# Patient Record
Sex: MALE | Race: BLACK OR AFRICAN AMERICAN
[De-identification: names, ages, dates, MRNs, and addresses within clinical notes are randomized per-mention and may not be internally consistent; named-entity substitution may affect disease eponyms.]

---

## 2023-01-28 ENCOUNTER — HOSPITAL ENCOUNTER (OUTPATIENT)
Dept: HOSPITAL 47 - EC | Age: 68
Setting detail: OBSERVATION
LOS: 3 days | Discharge: NURSING, REHAB OR LONG-TERM CARE | End: 2023-01-31
Attending: INTERNAL MEDICINE | Admitting: INTERNAL MEDICINE
Payer: MEDICARE

## 2023-01-28 VITALS — BODY MASS INDEX: 41.1 KG/M2

## 2023-01-28 DIAGNOSIS — Z86.19: ICD-10-CM

## 2023-01-28 DIAGNOSIS — M11.261: ICD-10-CM
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
## 2023-01-28 DIAGNOSIS — E86.0: ICD-10-CM

## 2023-01-28 DIAGNOSIS — M25.561: ICD-10-CM

## 2023-01-28 DIAGNOSIS — Z79.899: ICD-10-CM

## 2023-01-28 DIAGNOSIS — Z79.85: ICD-10-CM

## 2023-01-28 DIAGNOSIS — R55: ICD-10-CM
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
## 2023-01-28 DIAGNOSIS — Z87.440: ICD-10-CM

## 2023-01-28 DIAGNOSIS — N17.9: ICD-10-CM

## 2023-01-28 DIAGNOSIS — E66.01: ICD-10-CM

## 2023-01-28 DIAGNOSIS — L89.152: ICD-10-CM

## 2023-01-28 DIAGNOSIS — E11.622: ICD-10-CM

## 2023-01-28 DIAGNOSIS — M25.461: ICD-10-CM

## 2023-01-28 DIAGNOSIS — R42: Primary | ICD-10-CM

## 2023-01-28 DIAGNOSIS — I25.10: ICD-10-CM

## 2023-01-28 DIAGNOSIS — Z20.822: ICD-10-CM

## 2023-01-28 DIAGNOSIS — M17.11: ICD-10-CM

## 2023-01-28 DIAGNOSIS — R61: ICD-10-CM

## 2023-01-28 DIAGNOSIS — Z79.82: ICD-10-CM

## 2023-01-28 DIAGNOSIS — Z79.4: ICD-10-CM

## 2023-01-28 DIAGNOSIS — I44.1: ICD-10-CM

## 2023-01-28 DIAGNOSIS — D64.9: ICD-10-CM

## 2023-01-28 DIAGNOSIS — L89.892: ICD-10-CM

## 2023-01-28 DIAGNOSIS — I10: ICD-10-CM

## 2023-01-28 DIAGNOSIS — Z79.84: ICD-10-CM

## 2023-01-28 DIAGNOSIS — E78.5: ICD-10-CM

## 2023-01-28 DIAGNOSIS — Z91.81: ICD-10-CM

## 2023-01-28 LAB
ALBUMIN SERPL-MCNC: 3.1 G/DL (ref 3.5–5)
ALP SERPL-CCNC: 132 U/L (ref 38–126)
ALT SERPL-CCNC: 20 U/L (ref 4–49)
ANION GAP SERPL CALC-SCNC: 5 MMOL/L
APTT BLD: 26 SEC (ref 22–30)
AST SERPL-CCNC: 24 U/L (ref 17–59)
BASOPHILS # BLD AUTO: 0 K/UL (ref 0–0.2)
BASOPHILS NFR BLD AUTO: 1 %
BUN SERPL-SCNC: 14 MG/DL (ref 9–20)
CALCIUM SPEC-MCNC: 8.8 MG/DL (ref 8.4–10.2)
CHLORIDE SERPL-SCNC: 101 MMOL/L (ref 98–107)
CO2 SERPL-SCNC: 30 MMOL/L (ref 22–30)
EOSINOPHIL # BLD AUTO: 0.2 K/UL (ref 0–0.7)
EOSINOPHIL NFR BLD AUTO: 2 %
ERYTHROCYTE [DISTWIDTH] IN BLOOD BY AUTOMATED COUNT: 4.33 M/UL (ref 4.3–5.9)
ERYTHROCYTE [DISTWIDTH] IN BLOOD: 14.2 % (ref 11.5–15.5)
GLUCOSE BLD-MCNC: 139 MG/DL (ref 70–110)
GLUCOSE BLD-MCNC: 196 MG/DL (ref 70–110)
GLUCOSE SERPL-MCNC: 155 MG/DL (ref 74–99)
GLUCOSE UR QL: (no result)
HCT VFR BLD AUTO: 32.2 % (ref 39–53)
HGB BLD-MCNC: 10.9 GM/DL (ref 13–17.5)
INR PPP: 1.1 (ref ?–1.2)
LYMPHOCYTES # SPEC AUTO: 1.5 K/UL (ref 1–4.8)
LYMPHOCYTES NFR SPEC AUTO: 19 %
MAGNESIUM SPEC-SCNC: 1.8 MG/DL (ref 1.6–2.3)
MCH RBC QN AUTO: 25.3 PG (ref 25–35)
MCHC RBC AUTO-ENTMCNC: 34 G/DL (ref 31–37)
MCV RBC AUTO: 74.5 FL (ref 80–100)
MONOCYTES # BLD AUTO: 0.4 K/UL (ref 0–1)
MONOCYTES NFR BLD AUTO: 5 %
NEUTROPHILS # BLD AUTO: 5.9 K/UL (ref 1.3–7.7)
NEUTROPHILS NFR BLD AUTO: 72 %
PH UR: 7 [PH] (ref 5–8)
PLATELET # BLD AUTO: 392 K/UL (ref 150–450)
POTASSIUM SERPL-SCNC: 4.5 MMOL/L (ref 3.5–5.1)
PROT SERPL-MCNC: 6.7 G/DL (ref 6.3–8.2)
PT BLD: 11.5 SEC (ref 9–12)
SODIUM SERPL-SCNC: 136 MMOL/L (ref 137–145)
SP GR UR: 1.01 (ref 1–1.03)
UROBILINOGEN UR QL STRIP: <2 MG/DL (ref ?–2)
WBC # BLD AUTO: 8.1 K/UL (ref 3.8–10.6)
WBC # UR AUTO: 47 /HPF (ref 0–5)

## 2023-01-28 PROCEDURE — 84443 ASSAY THYROID STIM HORMONE: CPT

## 2023-01-28 PROCEDURE — 96372 THER/PROPH/DIAG INJ SC/IM: CPT

## 2023-01-28 PROCEDURE — 84484 ASSAY OF TROPONIN QUANT: CPT

## 2023-01-28 PROCEDURE — 85730 THROMBOPLASTIN TIME PARTIAL: CPT

## 2023-01-28 PROCEDURE — 99285 EMERGENCY DEPT VISIT HI MDM: CPT

## 2023-01-28 PROCEDURE — 36415 COLL VENOUS BLD VENIPUNCTURE: CPT

## 2023-01-28 PROCEDURE — 96361 HYDRATE IV INFUSION ADD-ON: CPT

## 2023-01-28 PROCEDURE — 70450 CT HEAD/BRAIN W/O DYE: CPT

## 2023-01-28 PROCEDURE — 73562 X-RAY EXAM OF KNEE 3: CPT

## 2023-01-28 PROCEDURE — 80048 BASIC METABOLIC PNL TOTAL CA: CPT

## 2023-01-28 PROCEDURE — 81001 URINALYSIS AUTO W/SCOPE: CPT

## 2023-01-28 PROCEDURE — 93306 TTE W/DOPPLER COMPLETE: CPT

## 2023-01-28 PROCEDURE — 96376 TX/PRO/DX INJ SAME DRUG ADON: CPT

## 2023-01-28 PROCEDURE — 96374 THER/PROPH/DIAG INJ IV PUSH: CPT

## 2023-01-28 PROCEDURE — 93005 ELECTROCARDIOGRAM TRACING: CPT

## 2023-01-28 PROCEDURE — 85025 COMPLETE CBC W/AUTO DIFF WBC: CPT

## 2023-01-28 PROCEDURE — 84439 ASSAY OF FREE THYROXINE: CPT

## 2023-01-28 PROCEDURE — 97162 PT EVAL MOD COMPLEX 30 MIN: CPT

## 2023-01-28 PROCEDURE — 83036 HEMOGLOBIN GLYCOSYLATED A1C: CPT

## 2023-01-28 PROCEDURE — 85610 PROTHROMBIN TIME: CPT

## 2023-01-28 PROCEDURE — 94760 N-INVAS EAR/PLS OXIMETRY 1: CPT

## 2023-01-28 PROCEDURE — 97535 SELF CARE MNGMENT TRAINING: CPT

## 2023-01-28 PROCEDURE — 80053 COMPREHEN METABOLIC PANEL: CPT

## 2023-01-28 PROCEDURE — 97166 OT EVAL MOD COMPLEX 45 MIN: CPT

## 2023-01-28 PROCEDURE — 71046 X-RAY EXAM CHEST 2 VIEWS: CPT

## 2023-01-28 PROCEDURE — 83735 ASSAY OF MAGNESIUM: CPT

## 2023-01-28 PROCEDURE — 87635 SARS-COV-2 COVID-19 AMP PRB: CPT

## 2023-01-28 PROCEDURE — 87086 URINE CULTURE/COLONY COUNT: CPT

## 2023-01-28 PROCEDURE — 96375 TX/PRO/DX INJ NEW DRUG ADDON: CPT

## 2023-01-28 RX ADMIN — FLUTICASONE PROPIONATE SCH SPRAY: 50 SPRAY, METERED NASAL at 13:34

## 2023-01-28 RX ADMIN — HEPARIN SODIUM SCH UNIT: 5000 INJECTION INTRAVENOUS; SUBCUTANEOUS at 16:40

## 2023-01-28 RX ADMIN — Medication SCH MG: at 20:33

## 2023-01-28 RX ADMIN — HEPARIN SODIUM SCH UNIT: 5000 INJECTION INTRAVENOUS; SUBCUTANEOUS at 22:57

## 2023-01-28 RX ADMIN — LOSARTAN POTASSIUM SCH MG: 50 TABLET, FILM COATED ORAL at 13:55

## 2023-01-28 RX ADMIN — INSULIN ASPART SCH: 100 INJECTION, SOLUTION INTRAVENOUS; SUBCUTANEOUS at 16:43

## 2023-01-28 NOTE — CT
EXAMINATION TYPE: CT brain wo con

CT DLP: 1202.4 mGycm, Automated exposure control for dose reduction was used.

 

DATE OF EXAM: 1/28/2023 10:29 AM

 

COMPARISON: None

 

CLINICAL INDICATION:Male, 67 years old with history of altered mental status, syncope, ams

 

TECHNIQUE: 

Brain: Axial CT images of the brain were obtained with coronal and sagittal reformats created and rev
iewed.

Contrast used: None.

Oral contrast used: None.

 

FINDINGS:

 

Brain:

Extra-axial spaces: No abnormal extra-axial fluid collections.

Ventricular system: Within normal limits

Cerebral parenchyma: No acute intraparenchymal hemorrhage or mass effect.  The gray-white junction is
 well differentiated. 

Cerebellum: Unremarkable.

Mass effect: No evidence of midline shift.

Intracranial vasculature: Atherosclerotic calcifications of the intracranial vessels.

Soft tissues: Normal.

Calvarium/osseous structures: No depressed skull fracture.

Paranasal sinuses and mastoid air cells: Mild scattered paranasal sinus disease.

Visualized orbits: Bilateral aphakia

 

IMPRESSION:

No acute intracranial process.

## 2023-01-28 NOTE — HP
HISTORY AND PHYSICAL



CHIEF COMPLAINT:

Bradycardia and heart block.



HISTORY OF PRESENT ILLNESS:

This is a 67-year-old gentleman with a past medical history of multiple medical

problems, who was recently admitted to [facility] with UTI with sepsis.  Subsequently, the

patient was rehabbed in [facility].  The patient had unresponsiveness and weakness.  The

patient's EKG showed Wenckebach phenomena, and the patient was admitted for further

evaluation, and beta-blockers have been stopped.  There is no history of any fever,

rigors, or chills.



PAST MEDICAL HISTORY:

Reviewed includes recent UTI with sepsis.  The rest of the history and the rest of the

chart are reviewed.



HOME MEDICATIONS:

Reviewed include metoprolol.  Doses and rest of the medications are noted.



ALLERGIES:

None.



FAMILY HISTORY:

No history of heart disease or strokes in the family.



SOCIAL HISTORY:

No history of smoking or alcohol.



REVIEW OF SYSTEMS:

Fourteen-point review is negative except as mentioned earlier.



PHYSICAL EXAMINATION:

VITAL SIGNS:  Pulse is 56, blood pressure 170/90, respirations 18.

HEENT:  Conjunctivae are normal.

NECK:  No jugular venous distention.

CARDIOVASCULAR:  S1 and S2 muffled.

RESPIRATORY:  Breath sounds diminished at the bases.  No rhonchi.  No crackles.

ABDOMEN:  Soft and nontender.

LEGS:  No edema.

NERVOUS SYSTEM:  No focal deficits.

SKIN:  No ulcers or rashes.

JOINT:  No active deforming arthropathy.



LABORATORY DATA:

Reviewed.



ASSESSMENT:

1. Syncope, possibly Wenckebach phenomenon and heart block.

2. History of recent urinary tract infection with sepsis.

3. Diabetes mellitus, type 2.

4. Hypertension.

5. Hyperlipidemia.

6. Possible sick euthyroid syndrome.



RECOMMENDATIONS:

Recommend to continue current medications and symptomatic treatment.  Otherwise, hold

the beta-blockers.  Continue the rest of medications.  Monitor telemetry.  Increase

ambulation.  PT and OT evaluation.  DVT prophylaxis.  Monitor blood sugars closely.

Cardiology evaluation.  Further recommendations to follow.  Prognosis is guarded.





MMODL / IJN: [id number] / Job#: [id number]

## 2023-01-28 NOTE — P.CRDCN
History of Present Illness


Consult date: 01/28/23


Requesting physician: Archie E Sheet


Reason for Consult (text): 


Second degree heart block, symptomatic





Chief complaint: lightheadedness, diaphoresis


History of present illness: 


This is a pleasant 67-year-old gentleman who does not follow regularly with a 

cardiologist.  He has a history of hypertension, hyperlipidemia and diabetes.  

Was recently in [facility] after becoming weak and falling at home.  At 

that time he was found to have a urinary tract infection and spent about 13 days

in the hospital.  He was subsequently transferred to [facility] for rehab.  He's 

been there since Tuesday.  He was transferred to the emergency department here 

after becoming quite lightheaded, somewhat short of breath, and diaphoretic and 

noted to have a low heart rate.  EKG admission and telemetry shows patient to be

in second-degree type I AV block with heart rates anywhere from 40s-50s.  He 

continues to have occasional episodes of lightheadedness.  Blood pressure has 

been elevated.  Labs showed a hemoglobin of 10.9, sodium 136, potassium 4.5, BUN

14, creatinine 1.21, troponin have been negative 2 and TSH is mildly elevated 

at 5.980.  He complains of lower extremity edema that he's had since he was 

admitted to [facility].  He denies any dyspnea on exertion but is not 

very active and has been less active recently.  Denies any orthopnea or PND.  

Denies any chest discomfort, palpitations or syncope.








Past Medical History


Past Medical History: Diabetes Mellitus, Hyperlipidemia, Hypertension


History of Any Multi-Drug Resistant Organisms: None Reported


Past Psychological History: No Psychological Hx Reported


Smoking Status: Never smoker


Past Alcohol Use History: None Reported


Past Drug Use History: None Reported





Medications and Allergies


                                Home Medications











 Medication  Instructions  Recorded  Confirmed  Type


 


Acetaminophen [Tylenol] 325 mg PO Q6H PRN 01/28/23 01/28/23 History


 


Aspirin EC [Ecotrin Low Dose] 81 mg PO DAILY 01/28/23 01/28/23 History


 


Atorvastatin Calcium [Lipitor] 40 mg PO DAILY 01/28/23 01/28/23 History


 


Cholecalciferol [Vitamin D3 (25 25 mcg PO DAILY 01/28/23 01/28/23 History





Mcg = 1000 Iu)]    


 


Coenzyme Q10 Tablet 100 mg PO BID 01/28/23 01/28/23 History


 


Cyanocobalamin [Vitamin B-12 1,000 mcg SQ QMONTHLY 01/28/23 01/28/23 History





Injection]    


 


Diclofenac Sodium [Voltaren] 75 mg PO BID PRN 01/28/23 01/28/23 History


 


Docusate [Colace] 100 mg PO BID 01/28/23 01/28/23 History


 


Dulaglutide [Trulicity] 1.5 mg SQ MO 01/28/23 01/28/23 History


 


Empagliflozin [Jardiance] 25 mg PO DAILY 01/28/23 01/28/23 History


 


Epoetin Sesar-Epbx [Retacrit] 4,000 units SQ MOWEFR 01/28/23 01/28/23 History


 


Ferrous Sulfate [Iron] 325 mg PO DAILY 01/28/23 01/28/23 History


 


Fluticasone Nasal Spray [Flonase 1 spray EA NOSTRIL Q12H 01/28/23 01/28/23 

History





Nasal Spray]    


 


Furosemide [Lasix] 40 mg PO DAILY 01/28/23 01/28/23 History


 


INSULIN LISPRO (HumaLOG) [humaLOG] See Protocol SQ AC-TID 01/28/23 01/28/23 

History


 


Insulin Glargine [Lantus Vial] 10 unit SQ DAILY 01/28/23 01/28/23 History


 


Lidocaine 5% Patch [Lidoderm] 2 patch TOPICAL DAILY 01/28/23 01/28/23 History


 


Losartan Potassium [Cozaar] 100 mg PO DAILY 01/28/23 01/28/23 History


 


Lutein 40 mg PO DAILY 01/28/23 01/28/23 History


 


Magnesium Hydroxide [Milk of 7,200 mg PO Q48H PRN 01/28/23 01/28/23 History





Magnesia Concentrate]    


 


Melatonin 1 mg PO HS 01/28/23 01/28/23 History


 


Metoprolol Succinate (ER) [Toprol 50 mg PO DAILY 01/28/23 01/28/23 History





Xl]    


 


Na Phos,M-B/Na Phos,Di-Ba [Fleet 133 ml RECTAL DAILY PRN 01/28/23 01/28/23 

History





Adult]    


 


Pantoprazole Sodium [Protonix] 40 mg PO DAILY 01/28/23 01/28/23 History


 


Sennosides-Docusate Sodium 1 tab PO DAILY 01/28/23 01/28/23 History





[Senokot-S]    


 


Sennosides-Docusate Sodium 2 tab PO HS 01/28/23 01/28/23 History





[Senokot-S]    


 


Sildenafil Citrate 100 mg PO DAILY PRN 01/28/23 01/28/23 History


 


Tamsulosin HCl [Flomax] 0.4 mg PO DAILY 01/28/23 01/28/23 History


 


bisacodyL [Dulcolax] 10 mg RECTAL DAILY PRN 01/28/23 01/28/23 History


 


traMADol HCL 50 mg PO Q6H PRN 01/28/23 01/28/23 History








                                    Allergies











Allergy/AdvReac Type Severity Reaction Status Date / Time


 


No Known Allergies Allergy   Verified 01/28/23 12:04














Physical Exam


Vitals: 


                                   Vital Signs











  Temp Pulse Resp BP Pulse Ox


 


 01/28/23 12:00   49 L  7 L  177/92  99


 


 01/28/23 11:00   64  17   98


 


 01/28/23 10:00   57 L  18  164/91  99


 


 01/28/23 09:00   71  16  173/94  97


 


 01/28/23 08:53  97.7 F  71  20  173/94  99








                                Intake and Output











 01/27/23 01/28/23 01/28/23





 22:59 06:59 14:59


 


Output Total   700


 


Balance   -700


 


Output:   


 


  Urine   700


 


    Uretheral (Spears)   700


 


Other:   


 


  Weight   102.058 kg











PHYSICAL EXAMINATION: This is a 67-year-old gentleman in no apparent distress at

 the time of my examination.





HEENT: Head is atraumatic, normocephalic. Pupils are equal, round. Sclerae 

anicteric. Conjunctivae are clear. Mucous membranes of the mouth are moist. Neck

 is supple. There is no elevated jugular venous pressure. No carotid bruit is 

heard.


 


CHEST EXAMINATION: Clear to auscultation bilaterally.  No wheezes rales or 

rhonchi. Respirations even and nonlabored.


 


HEART EXAMINATION:  Heart regular, positive S1 and S2.  No S3. No S4.  Systolic 

murmur


 





ABDOMEN: Soft, nontender. Bowel sounds are heard. No organomegaly noted.


 


EXTREMITIES: 2+ peripheral pulses with evidence of trace to mild peripheral 

edema and no calf tenderness noted.


 


NEUROLOGIC EXAMINATION: Patient is awake, alert and oriented x3.


 











Results





                                 01/28/23 09:20





                                 01/28/23 09:20


                                 Cardiac Enzymes











  01/28/23 01/28/23 01/28/23 Range/Units





  09:20 09:20 12:22 


 


AST  24    (17-59)  U/L


 


Troponin I   <0.012  <0.012  (0.000-0.034)  ng/mL








                                   Coagulation











  01/28/23 Range/Units





  09:21 


 


PT  11.5  (9.0-12.0)  sec


 


APTT  26.0  (22.0-30.0)  sec








                                       CBC











  01/28/23 Range/Units





  09:20 


 


WBC  8.1  (3.8-10.6)  k/uL


 


RBC  4.33  (4.30-5.90)  m/uL


 


Hgb  10.9 L  (13.0-17.5)  gm/dL


 


Hct  32.2 L  (39.0-53.0)  %


 


Plt Count  392  (150-450)  k/uL








                          Comprehensive Metabolic Panel











  01/28/23 Range/Units





  09:20 


 


Sodium  136 L  (137-145)  mmol/L


 


Potassium  4.5  (3.5-5.1)  mmol/L


 


Chloride  101  ()  mmol/L


 


Carbon Dioxide  30  (22-30)  mmol/L


 


BUN  14  (9-20)  mg/dL


 


Creatinine  1.21  (0.66-1.25)  mg/dL


 


Glucose  155 H  (74-99)  mg/dL


 


Calcium  8.8  (8.4-10.2)  mg/dL


 


AST  24  (17-59)  U/L


 


ALT  20  (4-49)  U/L


 


Alkaline Phosphatase  132 H  ()  U/L


 


Total Protein  6.7  (6.3-8.2)  g/dL


 


Albumin  3.1 L  (3.5-5.0)  g/dL








                               Current Medications











Generic Name Dose Route Start Last Admin





  Trade Name Freq  PRN Reason Stop Dose Admin


 


Acetaminophen  325 mg  01/28/23 12:57 





  Acetaminophen Tab 325 Mg Tab  PO  





  Q6H PRN  





  GENERAL DISCOMFORT  


 


Aspirin  81 mg  01/29/23 09:00 





  Aspirin 81 Mg  PO  





  DAILY [facility]  


 


Atorvastatin Calcium  40 mg  01/29/23 09:00 





  Atorvastatin 40 Mg Tab  PO  





  DAILY [facility]  


 


Cyanocobalamin  1,000 mcg  02/27/23 09:00 





  Cyanocobalamin 1,000 Mcg/Ml 1 Ml Vial  SQ  





  QMONTHLY [facility]  


 


Dapagliflozin  10 mg  01/29/23 09:00 





  Dapagliflozin Propanediol 10 Mg Tablet  PO  





  DAILY [facility]  


 


Dextrose/Water  25 ml  01/28/23 12:59 





  Dextrose 50% Syringe 50 Ml  IVP  





  PER PROTOCOL PRN  





  Hypoglycemia  





  Protocol  


 


Dextrose/Water  50 ml  01/28/23 12:59 





  Dextrose 50% Syringe 50 Ml  IVP  





  PER PROTOCOL PRN  





  Hypoglycemia  





  Protocol  


 


Ferrous Sulfate  325 mg  01/29/23 09:00 





  Ferrous Sulfate 325 Mg Tab  PO  





  DAILY [facility]  


 


Fluticasone Propionate  1 spray  01/28/23 13:00 





  Fluticasone 50mcg/Spray Nasal 16gm  EA NOSTRIL  





  Q12H [facility]  


 


Furosemide  40 mg  01/29/23 09:00 





  Furosemide 40 Mg Tab  PO  





  DAILY [facility]  


 


Heparin Sodium (Porcine)  5,000 unit  01/28/23 16:00 





  Heparin Sodium,Porcine/Pf 5,000 Unit/0.5 Ml Syringe  SQ  





  Q8HR [facility]  


 


Insulin Aspart  0 unit  01/28/23 17:30 





  Insulin Aspart (Novolog) 100 Unit/Ml Vial  SQ  





  AC-TID [facility]  





  Protocol  


 


Insulin Detemir  10 unit  01/29/23 09:00 





  Insulin Detemir (Levemir) 100 Unit/Ml Syr  SQ  





  DAILY [facility]  


 


Losartan Potassium  100 mg  01/29/23 09:00 





  Losartan 50 Mg Tab  PO  





  DAILY [facility]  


 


Melatonin  1 mg  01/28/23 21:00 





  Melatonin 1 Mg Tab  PO  





  HS [facility]  


 


Metoprolol Succinate  50 mg  01/29/23 09:00 





  Metoprolol Succinate (Er) 50 Mg Tab.Er.24h  PO  





  DAILY [facility]  


 


Naloxone HCl  0.2 mg  01/28/23 11:09 





  Naloxone 0.4 Mg/Ml 1 Ml Vial  IV  





  Q2M PRN  





  Opioid Reversal  


 


Non-Formulary Medication  100 mg  01/28/23 21:00 





  Coenzyme Q10 Tablet  PO  





  BID [facility]  


 


Non-Formulary Medication  4,000 units  01/30/23 12:57 





  Epoetin Sesar-Epbx [Retacrit]  SQ  





  MOWEFR [facility]  


 


Pantoprazole Sodium  40 mg  01/29/23 09:00 





  Pantoprazole 40 Mg Tablet  PO  





  DAILY [facility]  


 


Tamsulosin HCl  0.4 mg  01/29/23 09:00 





  Tamsulosin 0.4 Mg Cap.Er.24h  PO  





  DAILY [facility]  


 


Tramadol HCl  50 mg  01/28/23 12:57 





  Tramadol 50 Mg Tab  PO  





  Q6H PRN  





  Pain  








                                Intake and Output











 01/27/23 01/28/23 01/28/23





 22:59 06:59 14:59


 


Output Total   700


 


Balance   -700


 


Output:   


 


  Urine   700


 


    Uretheral (Spears)   700


 


Other:   


 


  Weight   102.058 kg








                                 Patient Weight











 01/29/23





 06:59


 


Weight 102.058 kg








                                        





                                 01/28/23 09:20 





                                 01/28/23 09:20 











Assessment and Plan


Assessment: 


#1 symptoms of lightheadedness, shortness of breath diaphoresis


2 second degree type I AV block


#3 hypertension, poorly controlled


#4 hyperlipidemia


#5 diabetes mellitus





Plan: 


From cardiology's perspective we'll work on getting records from [facility].  We will hold the beta blocker.  We'll obtain a 2-D echo with Doppler 

study to assess cardiac structure and function.  Check a Free T4. Add amlodipine

 for blood pressure control. Continue to follow patient for further 

recommendations accordingly.





NP note has been reviewed, I agree with a documented findings and plan of care. 

 Patient was seen and examined.

## 2023-01-28 NOTE — ED
General Adult HPI





- General


Chief complaint: Arrhythmia/Palpitations


Stated complaint: bradycardia


Time Seen by Provider: 01/28/23 09:02


Source: patient, EMS, RN notes reviewed, old records reviewed


Mode of arrival: EMS


Limitations: no limitations





- History of Present Illness


Initial comments: 





Patient is a 67-year-old male who was sent here from his rehab facility over 

concern for an episode of being unresponsive.  Patient had a low heart rate 

earlier this morning.  Seems slightly lethargic during that episode.  It 

resolved as did his symptoms.  Was sent here for further evaluation.  Does have 

a known history of second-degree heart block.  He is at rehab currently to 

regain his strength after recent UTI as well as fall.  Does have some sacral ul

cers that appear clean.  Denies any chest pain or shortness of breath at this 

time.  Denies any lightheadedness.  Is asking for a cookie.  Denies any other 

acute complaints at this time.  Presents for further evaluation.He states he is 

not on blood thinners.





- Related Data


                                Home Medications











 Medication  Instructions  Recorded  Confirmed


 


Acetaminophen [Tylenol] 325 mg PO Q6H PRN 01/28/23 01/28/23


 


Aspirin EC [Ecotrin Low Dose] 81 mg PO DAILY 01/28/23 01/28/23


 


Atorvastatin Calcium [Lipitor] 40 mg PO DAILY 01/28/23 01/28/23


 


Cholecalciferol [Vitamin D3 (25 25 mcg PO DAILY 01/28/23 01/28/23





Mcg = 1000 Iu)]   


 


Coenzyme Q10 Tablet 100 mg PO BID 01/28/23 01/28/23


 


Cyanocobalamin [Vitamin B-12 1,000 mcg SQ QMONTHLY 01/28/23 01/28/23





Injection]   


 


Diclofenac Sodium [Voltaren] 75 mg PO BID PRN 01/28/23 01/28/23


 


Docusate [Colace] 100 mg PO BID 01/28/23 01/28/23


 


Dulaglutide [Trulicity] 1.5 mg SQ MO 01/28/23 01/28/23


 


Empagliflozin [Jardiance] 25 mg PO DAILY 01/28/23 01/28/23


 


Epoetin Sesar-Epbx [Retacrit] 4,000 units SQ MOWEFR 01/28/23 01/28/23


 


Ferrous Sulfate [Iron] 325 mg PO DAILY 01/28/23 01/28/23


 


Fluticasone Nasal Spray [Flonase 1 spray EA NOSTRIL Q12H 01/28/23 01/28/23





Nasal Spray]   


 


Furosemide [Lasix] 40 mg PO DAILY 01/28/23 01/28/23


 


INSULIN LISPRO (HumaLOG) [humaLOG] See Protocol SQ AC-TID 01/28/23 01/28/23


 


Insulin Glargine [Lantus Vial] 10 unit SQ DAILY 01/28/23 01/28/23


 


Lidocaine 5% Patch [Lidoderm] 2 patch TOPICAL DAILY 01/28/23 01/28/23


 


Losartan Potassium [Cozaar] 100 mg PO DAILY 01/28/23 01/28/23


 


Lutein 40 mg PO DAILY 01/28/23 01/28/23


 


Magnesium Hydroxide [Milk of 7,200 mg PO Q48H PRN 01/28/23 01/28/23





Magnesia Concentrate]   


 


Melatonin 1 mg PO HS 01/28/23 01/28/23


 


Metoprolol Succinate (ER) [Toprol 50 mg PO DAILY 01/28/23 01/28/23





Xl]   


 


Na Phos,M-B/Na Phos,Di-Ba [Fleet 133 ml RECTAL DAILY PRN 01/28/23 01/28/23





Adult]   


 


Pantoprazole Sodium [Protonix] 40 mg PO DAILY 01/28/23 01/28/23


 


Sennosides-Docusate Sodium 1 tab PO DAILY 01/28/23 01/28/23





[Senokot-S]   


 


Sennosides-Docusate Sodium 2 tab PO HS 01/28/23 01/28/23





[Senokot-S]   


 


Sildenafil Citrate 100 mg PO DAILY PRN 01/28/23 01/28/23


 


Tamsulosin HCl [Flomax] 0.4 mg PO DAILY 01/28/23 01/28/23


 


bisacodyL [Dulcolax] 10 mg RECTAL DAILY PRN 01/28/23 01/28/23


 


traMADol HCL 50 mg PO Q6H PRN 01/28/23 01/28/23











                                    Allergies











Allergy/AdvReac Type Severity Reaction Status Date / Time


 


No Known Allergies Allergy   Verified 01/28/23 12:04














Review of Systems


ROS Statement: 


Those systems with pertinent positive or pertinent negative responses have been 

documented in the HPI.


Review of Systems:


CONST: Denies fever 


EYES: Denies blurry vision 


ENT: Denies nasal congestion  


C/V:  Denies Chest pain


RESP: Denies shortness of breath 


GI: Denies abdominal pain 


: Denies dysuria  


SKIN: Denies rash.


MSK: Denies joint pain.


NEURO: Denies headache 


ROS Other: All systems not noted in ROS Statement are negative.





Past Medical History


Past Medical History: Diabetes Mellitus, Hyperlipidemia, Hypertension


History of Any Multi-Drug Resistant Organisms: None Reported


Past Psychological History: No Psychological Hx Reported


Smoking Status: Never smoker


Past Alcohol Use History: None Reported


Past Drug Use History: None Reported





General Exam





- General Exam Comments


Initial Comments: 





General: Appears in no acute distress.


HEAD:  Normal with no signs of head trauma.


EYES:  PERRLA, EOMI, conjunctiva normal, no discharge.


ENT:  Hearing grossly intact, normal oropharynx.


RESPIRATORY:  Clear breath sounds bilaterally.  No wheezes, rales, or rhonchi.  


C/V:  Irregular rate and rhythm. S1 and S2 auscultated, no edema, peripheral 

pulses 2+ and intact throughout


ABD:  Abd is soft, nontender, nondistended


EXT: Normal range of motion, no obvious deformity


SKIN: Sacral decubitus ulcers appear clean.  Patient also has a decubitus on the

 back of his neck which also appears clean.  All appeared to be stage I to 2.


NEURO: Alert and oriented 4.  No acute focal deficits.  Chronic weakness.


Limitations: no limitations





Course


                                   Vital Signs











  01/28/23 01/28/23 01/28/23





  08:53 09:00 10:00


 


Temperature 97.7 F  


 


Pulse Rate 71 71 57 L


 


Respiratory 20 16 18





Rate   


 


Blood Pressure 173/94 173/94 164/91


 


O2 Sat by Pulse 99 97 99





Oximetry   














  01/28/23 01/28/23





  11:00 12:00


 


Temperature  


 


Pulse Rate 64 49 L


 


Respiratory 17 7 L





Rate  


 


Blood Pressure  177/92


 


O2 Sat by Pulse 98 99





Oximetry  














Medical Decision Making





- Medical Decision Making





Based on the patient's presentation and physical exam, I'm concerned for a 

dysrhythmia for the patient.  Has a known history of second-degree heart block. 

 An episode of being more unresponsive/near syncopal at the nursing facility.  

Unknown if he fell.  Patient was bradycardic at that time as well.  

Intermittently will have heart rates in the 40s here.  Did recommend a 

cardiopulmonary workup which patient was in agreement with.  Vital signs 

otherwise within acceptable limits.





EKG does show what appears to be a second-degree heart block.  Difficult to 

evaluated if it is type I or type II.  Patient's laboratory studies are 

remarkable for a chronic anemia with a hemoglobin of 10.9 which is stable.  

Troponin is undetectable.  Chest x-ray revealed no acute cardio pulmonary proces

s.  Brain CT revealed no acute intracranial injury.





On reevaluation, patient is unchanged.  He is asymptomatic.  Patient's family is

 at bedside and I did update both the patient and family on his workup.  Due to 

what appeared to be symptomatic bradycardia possibly, second-degree heart block 

I did recommend that we admit him to the hospital for cardiology evaluation.  He

 was in agreement with this plan.





Cardiology was consulted.  I spoke with the admitting team, Dr. monique who is 

admitting for Dr. Clifford who normally covers Dr. Morris.  He accepted the 

patient.  Patient was admitted in stable condition.  Patient was admitted to 

telemetry floor.





Was pt. sent in by a medical professional or institution (, PA, NP, urgent 

care, hospital, or nursing home...) When possible be specific


@  -Nursing home for evaluation of bradycardia, near syncope.


Did you speak to anyone other than the patient for history (EMS, parent, family,

 police, friend...)? What history was obtained from this source 


@  -No


Did you review nursing and triage notes (agree or disagree)?  Why? 


@  -I reviewed and agree with nursing and triage notes


Were old charts reviewed (outside hosp., previous admission, EMS record, old 

EKG, old radiological studies, urgent care reports/EKG's, nursing home records)?

 Report findings 


@  -Yes, old charts were reviewed from the outside facility.


Differential Diagnosis (chest pain, altered mental status, abdominal pain women,

 abdominal pain men, vaginal bleeding, weakness, fever, dyspnea, syncope, 

headache, dizziness, GI bleed, back pain, seizure, CVA, palpatations, mental 

health)? 


@  -Dehydration, near syncope, arrhythmia, heart block, CAD.  This list is not 

all inclusive.


EKG interpreted by me (3pts min.).


@  -As above


X-rays interpreted by me (1pt min.).


@  -Chest x-ray revealed no acute cardiopulmonary process.


CT interpreted by me (1pt min.).


@  -None done


U/S interpreted by me (1pt. min.).


@  -None done


What testing was considered but not performed or refused? (CT, X-rays, U/S, 

labs)? Why?


@  -None


What meds were considered but not given or refused? Why?


@  -None


Did you discuss the management of the patient with other professionals 

(professionals i.e. , PA, NP, lab, RT, psych nurse, , , 

teacher, , )? Give summary


@  -Yes, the admitting physician Dr. monique who accepted the admission.


Was smoking cessation discussed for >3mins.?


@  -No


Was critical care preformed (if so, how long)?


@  -No


Were there social determinants of health that impacted care today? How? (Homel

essness, low income, unemployed, alcoholism, drug addiction, transportation, low

 edu. Level, literacy, decrease access to med. care, assisted, rehab)?


@  -No


Was there de-escalation of care discussed even if they declined (Discuss DNR or 

withdrawal of care, Hospice)? DNR status


@  -No


What co-morbidities impacted this encounter? (DM, HTN, Smoking, COPD, CAD, 

Cancer, CVA, ARF, Chemo, Hep., AIDS, mental health diagnosis, sleep apnea, 

morbid obesity)?


@  -Chronic debility, CAD, hypertension


Was patient admitted / discharged? Hospital course, mention meds given and 

route, prescriptions, significant lab abnormalities, going to OR and other 

pertinent info.


@  -Admitted to the hospital.  See above for ED course.


Undiagnosed new problem with uncertain prognosis?


@  -No


Drug Therapy requiring intensive monitoring for toxicity (Heparin, Nitro, 

Insulin, Cardizem)?


@  -No


Were any procedures done?


@  -No


Diagnosis/symptom?


@  -Near syncope


Acute, or Chronic, or Acute on Chronic?


@  -Acute


Uncomplicated (without systemic symptoms) or Complicated (systemic symptoms)?


@  -Complicated


Side effects of treatment?


@  -No


Exacerbation, Progression, or Severe Exacerbation?


@  -No


Poses a threat to life or bodily function? How? (Chest pain, USA, MI, pneumonia,

 PE, COPD, DKA, ARF, appy, cholecystitis, CVA, Diverticulitis, Homicidal, 

Suicidal, threat to staff... and all critical care pts)


@  -No





Diagnosis/symptom?


@  -Second-degree heart block with bradycardia


Acute, or Chronic, or Acute on Chronic?


@  -Acute on chronic


Uncomplicated (without systemic symptoms) or Complicated (systemic symptoms)?


@  -Complicated


Side effects of treatment?


@  -none


Exacerbation, Progression, or Severe Exacerbation]


@  -no


Poses a threat to life or bodily function?


@  -Yes, if untreated can result in significant morbidity and mortality.








- Lab Data


Result diagrams: 


                                 01/28/23 09:20





                                 01/28/23 09:20


                                   Lab Results











  01/28/23 01/28/23 01/28/23 Range/Units





  09:20 09:20 09:20 


 


WBC  8.1    (3.8-10.6)  k/uL


 


RBC  4.33    (4.30-5.90)  m/uL


 


Hgb  10.9 L    (13.0-17.5)  gm/dL


 


Hct  32.2 L    (39.0-53.0)  %


 


MCV  74.5 L    (80.0-100.0)  fL


 


MCH  25.3    (25.0-35.0)  pg


 


MCHC  34.0    (31.0-37.0)  g/dL


 


RDW  14.2    (11.5-15.5)  %


 


Plt Count  392    (150-450)  k/uL


 


MPV  8.7    


 


Neutrophils %  72    %


 


Lymphocytes %  19    %


 


Monocytes %  5    %


 


Eosinophils %  2    %


 


Basophils %  1    %


 


Neutrophils #  5.9    (1.3-7.7)  k/uL


 


Lymphocytes #  1.5    (1.0-4.8)  k/uL


 


Monocytes #  0.4    (0-1.0)  k/uL


 


Eosinophils #  0.2    (0-0.7)  k/uL


 


Basophils #  0.0    (0-0.2)  k/uL


 


Microcytosis  Slight    


 


PT     (9.0-12.0)  sec


 


INR     (<1.2)  


 


APTT     (22.0-30.0)  sec


 


Sodium   136 L   (137-145)  mmol/L


 


Potassium   4.5   (3.5-5.1)  mmol/L


 


Chloride   101   ()  mmol/L


 


Carbon Dioxide   30   (22-30)  mmol/L


 


Anion Gap   5   mmol/L


 


BUN   14   (9-20)  mg/dL


 


Creatinine   1.21   (0.66-1.25)  mg/dL


 


Est GFR (CKD-EPI)AfAm   71   (>60 ml/min/1.73 sqM)  


 


Est GFR (CKD-EPI)NonAf   61   (>60 ml/min/1.73 sqM)  


 


Glucose   155 H   (74-99)  mg/dL


 


Calcium   8.8   (8.4-10.2)  mg/dL


 


Magnesium   1.8   (1.6-2.3)  mg/dL


 


Total Bilirubin   0.9   (0.2-1.3)  mg/dL


 


AST   24   (17-59)  U/L


 


ALT   20   (4-49)  U/L


 


Alkaline Phosphatase   132 H   ()  U/L


 


Troponin I    <0.012  (0.000-0.034)  ng/mL


 


Total Protein   6.7   (6.3-8.2)  g/dL


 


Albumin   3.1 L   (3.5-5.0)  g/dL


 


TSH   5.980 H   (0.465-4.680)  mIU/L














  01/28/23 Range/Units





  09:21 


 


WBC   (3.8-10.6)  k/uL


 


RBC   (4.30-5.90)  m/uL


 


Hgb   (13.0-17.5)  gm/dL


 


Hct   (39.0-53.0)  %


 


MCV   (80.0-100.0)  fL


 


MCH   (25.0-35.0)  pg


 


MCHC   (31.0-37.0)  g/dL


 


RDW   (11.5-15.5)  %


 


Plt Count   (150-450)  k/uL


 


MPV   


 


Neutrophils %   %


 


Lymphocytes %   %


 


Monocytes %   %


 


Eosinophils %   %


 


Basophils %   %


 


Neutrophils #   (1.3-7.7)  k/uL


 


Lymphocytes #   (1.0-4.8)  k/uL


 


Monocytes #   (0-1.0)  k/uL


 


Eosinophils #   (0-0.7)  k/uL


 


Basophils #   (0-0.2)  k/uL


 


Microcytosis   


 


PT  11.5  (9.0-12.0)  sec


 


INR  1.1  (<1.2)  


 


APTT  26.0  (22.0-30.0)  sec


 


Sodium   (137-145)  mmol/L


 


Potassium   (3.5-5.1)  mmol/L


 


Chloride   ()  mmol/L


 


Carbon Dioxide   (22-30)  mmol/L


 


Anion Gap   mmol/L


 


BUN   (9-20)  mg/dL


 


Creatinine   (0.66-1.25)  mg/dL


 


Est GFR (CKD-EPI)AfAm   (>60 ml/min/1.73 sqM)  


 


Est GFR (CKD-EPI)NonAf   (>60 ml/min/1.73 sqM)  


 


Glucose   (74-99)  mg/dL


 


Calcium   (8.4-10.2)  mg/dL


 


Magnesium   (1.6-2.3)  mg/dL


 


Total Bilirubin   (0.2-1.3)  mg/dL


 


AST   (17-59)  U/L


 


ALT   (4-49)  U/L


 


Alkaline Phosphatase   ()  U/L


 


Troponin I   (0.000-0.034)  ng/mL


 


Total Protein   (6.3-8.2)  g/dL


 


Albumin   (3.5-5.0)  g/dL


 


TSH   (0.465-4.680)  mIU/L














- EKG Data


-: EKG Interpreted by Me


EKG Comments: 





12-lead Electrocardiogram Interpretation Note





EKG was reviewed and interpreted by myself. 12-lead ECG performed at 0848 is 

interpreted by me as revealing sinus bradycardia with second degree AV block at 

a rate of 57 beats per minute.  Axis is normal.  QRS duration is 105 ms, QTc is 

482 ms..  There are T-wave inversions in lead V2 and V3 with no prior EKG for 

comparison.. R wave progression across the precordium was satisfactory. By my 

interpretation this EKG is non-diagnostic for acute ischemia.





12-lead Electrocardiogram Interpretation Note





EKG was reviewed and interpreted by myself. 12-lead ECG performed at 1003 is 

interpreted by me as revealing sinus bradycardia with what appears to be second-

degree AV block at a rate of 49 beats per minute.  Axis is normal.  CT interval 

is 230 ms, QRS duration is 109 ms, QTc is 434 ms..  There were no ST or T wave 

abnormalities to suggest myocardial ischemia or injury. R wave progression 

across the precordium was satisfactory. By my interpretation this EKG is non-

diagnostic for acute ischemia.  When compared with EKG from earlier, no sign

ificant change.  The CT interval does appear to be increasing in length.








Disposition


Clinical Impression: 


 Second degree heart block, Near syncope, Bradycardia





Disposition: ADMITTED AS IP TO THIS [facility]


Condition: Stable


Time of Disposition: 10:50

## 2023-01-28 NOTE — XR
EXAMINATION TYPE: XR chest 2V

 

DATE OF EXAM: 1/28/2023 9:28 AM

 

COMPARISON: Chest radiographs from

 

TECHNIQUE: XR chest 2V Frontal and lateral views of the chest.

 

CLINICAL INDICATION:Male, 67 years old with history of dysrhythmia; 

 

FINDINGS: 

Lungs/Pleura: Low lung volumes are present. There is no evidence of pleural effusion, focal consolida
tion, or pneumothorax.  

Pulmonary vascularity: Unremarkable.

Heart/mediastinum: Cardiomediastinal silhouette is unremarkable.

Musculoskeletal: No acute osseous pathology.

 

 

IMPRESSION: 

Low lung volumes without acute process.

## 2023-01-29 LAB
ANION GAP SERPL CALC-SCNC: 7 MMOL/L
BASOPHILS # BLD AUTO: 0 K/UL (ref 0–0.2)
BASOPHILS NFR BLD AUTO: 1 %
BUN SERPL-SCNC: 14 MG/DL (ref 9–20)
CALCIUM SPEC-MCNC: 8.6 MG/DL (ref 8.4–10.2)
CHLORIDE SERPL-SCNC: 101 MMOL/L (ref 98–107)
CO2 SERPL-SCNC: 26 MMOL/L (ref 22–30)
EOSINOPHIL # BLD AUTO: 0.1 K/UL (ref 0–0.7)
EOSINOPHIL NFR BLD AUTO: 2 %
ERYTHROCYTE [DISTWIDTH] IN BLOOD BY AUTOMATED COUNT: 4.29 M/UL (ref 4.3–5.9)
ERYTHROCYTE [DISTWIDTH] IN BLOOD: 14.3 % (ref 11.5–15.5)
GLUCOSE BLD-MCNC: 148 MG/DL (ref 70–110)
GLUCOSE BLD-MCNC: 162 MG/DL (ref 70–110)
GLUCOSE BLD-MCNC: 165 MG/DL (ref 70–110)
GLUCOSE BLD-MCNC: 180 MG/DL (ref 70–110)
GLUCOSE BLD-MCNC: 216 MG/DL (ref 70–110)
GLUCOSE SERPL-MCNC: 146 MG/DL (ref 74–99)
HCT VFR BLD AUTO: 33.1 % (ref 39–53)
HGB BLD-MCNC: 10.7 GM/DL (ref 13–17.5)
LYMPHOCYTES # SPEC AUTO: 1.4 K/UL (ref 1–4.8)
LYMPHOCYTES NFR SPEC AUTO: 19 %
MCH RBC QN AUTO: 24.9 PG (ref 25–35)
MCHC RBC AUTO-ENTMCNC: 32.3 G/DL (ref 31–37)
MCV RBC AUTO: 77 FL (ref 80–100)
MONOCYTES # BLD AUTO: 0.4 K/UL (ref 0–1)
MONOCYTES NFR BLD AUTO: 6 %
NEUTROPHILS # BLD AUTO: 5.2 K/UL (ref 1.3–7.7)
NEUTROPHILS NFR BLD AUTO: 71 %
PLATELET # BLD AUTO: 343 K/UL (ref 150–450)
POTASSIUM SERPL-SCNC: 4.4 MMOL/L (ref 3.5–5.1)
SODIUM SERPL-SCNC: 134 MMOL/L (ref 137–145)
WBC # BLD AUTO: 7.2 K/UL (ref 3.8–10.6)

## 2023-01-29 RX ADMIN — Medication SCH MG: at 21:18

## 2023-01-29 RX ADMIN — PANTOPRAZOLE SODIUM SCH MG: 40 INJECTION, POWDER, FOR SOLUTION INTRAVENOUS at 20:38

## 2023-01-29 RX ADMIN — Medication SCH MG: at 09:31

## 2023-01-29 RX ADMIN — DAPAGLIFLOZIN SCH MG: 10 TABLET, FILM COATED ORAL at 09:31

## 2023-01-29 RX ADMIN — INSULIN ASPART SCH UNIT: 100 INJECTION, SOLUTION INTRAVENOUS; SUBCUTANEOUS at 06:34

## 2023-01-29 RX ADMIN — MECLIZINE PRN MG: 12.5 TABLET ORAL at 13:07

## 2023-01-29 RX ADMIN — INSULIN ASPART SCH UNIT: 100 INJECTION, SOLUTION INTRAVENOUS; SUBCUTANEOUS at 16:57

## 2023-01-29 RX ADMIN — FLUTICASONE PROPIONATE SCH: 50 SPRAY, METERED NASAL at 14:39

## 2023-01-29 RX ADMIN — FUROSEMIDE SCH MG: 40 TABLET ORAL at 09:31

## 2023-01-29 RX ADMIN — LOSARTAN POTASSIUM SCH MG: 50 TABLET, FILM COATED ORAL at 09:31

## 2023-01-29 RX ADMIN — INSULIN ASPART SCH UNIT: 100 INJECTION, SOLUTION INTRAVENOUS; SUBCUTANEOUS at 12:07

## 2023-01-29 RX ADMIN — Medication SCH MCG: at 09:30

## 2023-01-29 RX ADMIN — INSULIN DETEMIR SCH UNIT: 100 INJECTION, SOLUTION SUBCUTANEOUS at 09:31

## 2023-01-29 RX ADMIN — ASPIRIN 81 MG CHEWABLE TABLET SCH MG: 81 TABLET CHEWABLE at 09:31

## 2023-01-29 RX ADMIN — FLUTICASONE PROPIONATE SCH: 50 SPRAY, METERED NASAL at 01:18

## 2023-01-29 RX ADMIN — ATORVASTATIN CALCIUM SCH MG: 40 TABLET, FILM COATED ORAL at 09:31

## 2023-01-29 RX ADMIN — DOCUSATE SODIUM AND SENNOSIDES SCH EACH: 50; 8.6 TABLET ORAL at 09:31

## 2023-01-29 RX ADMIN — HEPARIN SODIUM SCH UNIT: 5000 INJECTION INTRAVENOUS; SUBCUTANEOUS at 16:57

## 2023-01-29 RX ADMIN — HEPARIN SODIUM SCH UNIT: 5000 INJECTION INTRAVENOUS; SUBCUTANEOUS at 09:30

## 2023-01-29 RX ADMIN — TAMSULOSIN HYDROCHLORIDE SCH MG: 0.4 CAPSULE ORAL at 09:31

## 2023-01-29 NOTE — P.PN
Subjective


Progress Note Date: 01/29/23


This is a pleasant 67-year-old gentleman who does not follow regularly with a 

cardiologist.  He has a history of hypertension, hyperlipidemia and diabetes.  

Was recently in [facility] after becoming weak and falling at home.  At 

that time he was found to have a urinary tract infection and spent about 13 days

in the hospital.  He was subsequently transferred to [facility] for rehab.  He's 

been there since Tuesday.  He was transferred to the emergency department here 

after becoming quite lightheaded, somewhat short of breath, and diaphoretic and 

noted to have a low heart rate.  EKG admission and telemetry shows patient to be

in second-degree type I AV block with heart rates anywhere from 40s-50s.  He 

continues to have occasional episodes of lightheadedness.  Blood pressure has 

been elevated.  Labs showed a hemoglobin of 10.9, sodium 136, potassium 4.5, BUN

14, creatinine 1.21, troponin have been negative 2 and TSH is mildly elevated 

at 5.980.  He complains of lower extremity edema that he's had since he was 

admitted to [facility].  He denies any dyspnea on exertion but is not 

very active and has been less active recently.  Denies any orthopnea or PND.  

Denies any chest discomfort, palpitations or syncope.





1/29/2023


Patient was seen and examined resting comfortably in bed.  Metoprolol succinate 

continues to be on hold.  Continues to have episodes of second-degree type I AV 

block.  Had an episode of nausea, dizziness and diaphoresis morning but is 

feeling better now.  Put pressure is better controlled.  But remains elevated.  

Labs are stable.  Echocardiogram with Doppler study showed normal LV systolic 

function with mild MR and mild TR.








Objective





- Vital Signs


Vital signs: 


                                   Vital Signs











Temp  98.3 F   01/29/23 07:42


 


Pulse  59 L  01/29/23 07:42


 


Resp  17   01/29/23 07:42


 


BP  155/73   01/29/23 07:42


 


Pulse Ox  100   01/29/23 08:19


 


FiO2  21   01/28/23 19:57








                                 Intake & Output











 01/28/23 01/29/23 01/29/23





 18:59 06:59 18:59


 


Intake Total  10 


 


Output Total 1300 2075 


 


Balance -1300 -2065 


 


Weight 145.15 kg  


 


Intake:   


 


  IV  10 


 


    0.9  10 


 


Output:   


 


  Urine 1300 2075 


 


    Uretheral ([place]) 700  


 


Other:   


 


  Voiding Method Indwelling Catheter Indwelling Catheter Indwelling Catheter














- Exam


HEENT: Head is atraumatic, normocephalic. Pupils are equal, round. Sclerae 

anicteric. Conjunctivae are clear. Mucous membranes of the mouth are moist. Neck

is supple. There is no elevated jugular venous pressure. No carotid bruit is 

heard.


 


CHEST EXAMINATION: Clear to auscultation bilaterally.  No wheezes rales or 

rhonchi. Respirations even and nonlabored.


 


HEART EXAMINATION:  Heart regular, positive S1 and S2.  No S3. No S4.  Systolic 

murmur


 





ABDOMEN: Soft, nontender. Bowel sounds are heard. No organomegaly noted.


 


EXTREMITIES: 2+ peripheral pulses with evidence of trace to mild peripheral 

edema and no calf tenderness noted.


 


NEUROLOGIC EXAMINATION: Patient is awake, alert and oriented x3.








- Labs


CBC & Chem 7: 


                                 01/29/23 08:06





                                 01/29/23 08:06


Labs: 


                  Abnormal Lab Results - Last 24 Hours (Table)











  01/28/23 01/28/23 01/28/23 Range/Units





  09:20 09:20 16:35 


 


RBC     (4.30-5.90)  m/uL


 


Hgb     (13.0-17.5)  gm/dL


 


Hct     (39.0-53.0)  %


 


MCV     (80.0-100.0)  fL


 


MCH     (25.0-35.0)  pg


 


Sodium     (137-145)  mmol/L


 


Glucose     (74-99)  mg/dL


 


POC Glucose (mg/dL)    139 H  ()  mg/dL


 


Hemoglobin A1c   9.6 H   (0.0-6.0)  %


 


Urine Glucose (UA)  4+ H    (Negative)  


 


Urine Ketones  Trace H    (Negative)  


 


Ur Leukocyte Esterase  Moderate H    (Negative)  


 


Urine WBC  47 H    (0-5)  /hpf


 


Urine Mucus  Rare H    (None)  /hpf














  01/28/23 01/29/23 01/29/23 Range/Units





  20:10 06:18 08:06 


 


RBC     (4.30-5.90)  m/uL


 


Hgb     (13.0-17.5)  gm/dL


 


Hct     (39.0-53.0)  %


 


MCV     (80.0-100.0)  fL


 


MCH     (25.0-35.0)  pg


 


Sodium    134 L  (137-145)  mmol/L


 


Glucose    146 H  (74-99)  mg/dL


 


POC Glucose (mg/dL)  196 H  165 H   ()  mg/dL


 


Hemoglobin A1c     (0.0-6.0)  %


 


Urine Glucose (UA)     (Negative)  


 


Urine Ketones     (Negative)  


 


Ur Leukocyte Esterase     (Negative)  


 


Urine WBC     (0-5)  /hpf


 


Urine Mucus     (None)  /hpf














  01/29/23 01/29/23 01/29/23 Range/Units





  08:06 09:29 11:38 


 


RBC  4.29 L    (4.30-5.90)  m/uL


 


Hgb  10.7 L    (13.0-17.5)  gm/dL


 


Hct  33.1 L    (39.0-53.0)  %


 


MCV  77.0 L    (80.0-100.0)  fL


 


MCH  24.9 L    (25.0-35.0)  pg


 


Sodium     (137-145)  mmol/L


 


Glucose     (74-99)  mg/dL


 


POC Glucose (mg/dL)   148 H  162 H  ()  mg/dL


 


Hemoglobin A1c     (0.0-6.0)  %


 


Urine Glucose (UA)     (Negative)  


 


Urine Ketones     (Negative)  


 


Ur Leukocyte Esterase     (Negative)  


 


Urine WBC     (0-5)  /hpf


 


Urine Mucus     (None)  /hpf








                      Microbiology - Last 24 Hours (Table)











 01/28/23 09:20 Urine Culture - Final





 Urine,Voided 














Assessment and Plan


Assessment: 


#1 symptoms of lightheadedness, shortness of breath diaphoresis


2 second degree type I AV block


#3 hypertension, poorly controlled


#4 hyperlipidemia


#5 diabetes mellitus





Plan: 


From cardiology's perspective we'll work on getting records from [facility].  We will continue to hold the beta blocker.  Continue to follow blood 

pressure closely. The symptoms are unlikely to be related to the Second degree 

type I AVB. Continue to follow patient for further recommendations accordingly.





NP note has been reviewed, I agree with a documented findings and plan of care. 

Patient was seen and examined.

## 2023-01-29 NOTE — CA
Transthoracic Echo Report 

 Name: Angel De La Rosa 

 MRN:    [id number] 

 Age:    67     Gender:     M 

 

 :    1955 

 Exam Date:     2023 16:52 

 Exam Location: [facility] 

 Ht (in):     74     Wt (lb):     225 

 Ordering Physician:        Kori Benito 

 Attending/Referring Phys:         [id number] 

 Technician 

 Procedure CPT: 

 Indications:       Second degree Type 1 AVB 

 

 Cardiac Hx: 

 Technical Quality:      Technically difficult study 

 Contrast 1:    Lumason                     Total Dose (mL):      4 

 Contrast 2:                                Total Dose (mL): 

 

 MEASUREMENTS  (Male / Female) Normal Values 

 2D ECHO 

 LV Diastolic Diameter PLAX        5.1 cm                4.2 - 5.9 / 3.9 - 5.3 cm 

 LV Systolic Diameter PLAX         3.1 cm                 

 IVS Diastolic Thickness           1.4 cm                0.6 - 1.0 / 0.6 - 0.9 cm 

 LVPW Diastolic Thickness          1.2 cm                0.6 - 1.0 / 0.6 - 0.9 cm 

 LV Relative Wall Thickness        0.5                    

 LA Volume                         70.2 cm???              18 - 58 / 22 - 52 cm??? 

 

 M-MODE 

 Aortic Root Diameter MM           3.9 cm                 

 LA Systolic Diameter MM           5.0 cm                 

 LA Ao Ratio MM                    1.3                    

 AV Cusp Separation MM             1.8 cm                 

 

 DOPPLER 

 AV Peak Velocity                  158.2 cm/s             

 AV Peak Gradient                  10.0 mmHg              

 LVOT Peak Velocity                125.4 cm/s             

 LVOT Peak Gradient                6.3 mmHg               

 TR Peak Velocity                  149.3 cm/s             

 TR Peak Gradient                  8.9 mmHg               

 Right Ventricular Systolic Press  13.9 mmHg              

 

 

 FINDINGS 

 Left Ventricle 

 Mildly increased septal wall thickness. Normal left ventricular systolic  

 function with no obvious regional wall motion abnormalities. Left ventricular  

 ejection fraction is estimated at 55-60%. Left ventricular cavity size normal. 

 

 Right Ventricle 

 Normal right ventricular size and function. Right ventricular systolic pressure  

 within normal limits. 

 

 Right Atrium 

 Right atrium not well visualized. 

 

 Left Atrium 

 Moderately increased left atrial volume. Mildly increased left atrial area. 

 

 Mitral Valve 

 No mitral stenosis. Mild mitral regurgitation.mitral annular calcification. 

 

 Aortic Valve 

 No aortic valve stenosis or regurgitation. 

 

 Tricuspid Valve 

 Mild tricuspid regurgitation.structurally normal tricuspid valve. 

 

 Pulmonic Valve 

 Pulmonic valve not well visualized. 

 

 Pericardium 

 No pericardial effusion. 

 

 Aorta 

 Normal size aortic root and proximal ascending aorta. 

 

 CONCLUSIONS 

 Lumason ECHO contrast used for improved visualization of the endocardial  

 borders (inadequate visualization of two or more contiguous segments).  

 1.  Normal left ventricle size and systolic function 

 2.  Mild mitral and tricuspid regurgitation 

 Previewed by:  

 Dr. Luc Contreras MD 

 (Electronically Signed) 

 Final Date:      2023 08:14

## 2023-01-30 LAB
ANION GAP SERPL CALC-SCNC: 5 MMOL/L
BASOPHILS # BLD AUTO: 0 K/UL (ref 0–0.2)
BASOPHILS NFR BLD AUTO: 1 %
BUN SERPL-SCNC: 12 MG/DL (ref 9–20)
CALCIUM SPEC-MCNC: 8.1 MG/DL (ref 8.4–10.2)
CHLORIDE SERPL-SCNC: 99 MMOL/L (ref 98–107)
CO2 SERPL-SCNC: 31 MMOL/L (ref 22–30)
EOSINOPHIL # BLD AUTO: 0.1 K/UL (ref 0–0.7)
EOSINOPHIL NFR BLD AUTO: 1 %
ERYTHROCYTE [DISTWIDTH] IN BLOOD BY AUTOMATED COUNT: 4.21 M/UL (ref 4.3–5.9)
ERYTHROCYTE [DISTWIDTH] IN BLOOD: 14.1 % (ref 11.5–15.5)
GLUCOSE BLD-MCNC: 147 MG/DL (ref 70–110)
GLUCOSE BLD-MCNC: 161 MG/DL (ref 70–110)
GLUCOSE BLD-MCNC: 165 MG/DL (ref 70–110)
GLUCOSE BLD-MCNC: 201 MG/DL (ref 70–110)
GLUCOSE BLD-MCNC: 217 MG/DL (ref 70–110)
GLUCOSE SERPL-MCNC: 144 MG/DL (ref 74–99)
HCT VFR BLD AUTO: 31.9 % (ref 39–53)
HGB BLD-MCNC: 10.6 GM/DL (ref 13–17.5)
LYMPHOCYTES # SPEC AUTO: 1.2 K/UL (ref 1–4.8)
LYMPHOCYTES NFR SPEC AUTO: 16 %
MCH RBC QN AUTO: 25.1 PG (ref 25–35)
MCHC RBC AUTO-ENTMCNC: 33.1 G/DL (ref 31–37)
MCV RBC AUTO: 75.9 FL (ref 80–100)
MONOCYTES # BLD AUTO: 0.6 K/UL (ref 0–1)
MONOCYTES NFR BLD AUTO: 8 %
NEUTROPHILS # BLD AUTO: 5.7 K/UL (ref 1.3–7.7)
NEUTROPHILS NFR BLD AUTO: 74 %
PLATELET # BLD AUTO: 328 K/UL (ref 150–450)
POTASSIUM SERPL-SCNC: 4.4 MMOL/L (ref 3.5–5.1)
SODIUM SERPL-SCNC: 135 MMOL/L (ref 137–145)
WBC # BLD AUTO: 7.8 K/UL (ref 3.8–10.6)

## 2023-01-30 RX ADMIN — INSULIN ASPART SCH: 100 INJECTION, SOLUTION INTRAVENOUS; SUBCUTANEOUS at 06:11

## 2023-01-30 RX ADMIN — ATORVASTATIN CALCIUM SCH MG: 40 TABLET, FILM COATED ORAL at 09:10

## 2023-01-30 RX ADMIN — HEPARIN SODIUM SCH UNIT: 5000 INJECTION INTRAVENOUS; SUBCUTANEOUS at 09:06

## 2023-01-30 RX ADMIN — HEPARIN SODIUM SCH UNIT: 5000 INJECTION INTRAVENOUS; SUBCUTANEOUS at 23:57

## 2023-01-30 RX ADMIN — PANTOPRAZOLE SODIUM SCH MG: 40 INJECTION, POWDER, FOR SOLUTION INTRAVENOUS at 20:26

## 2023-01-30 RX ADMIN — INSULIN ASPART SCH UNIT: 100 INJECTION, SOLUTION INTRAVENOUS; SUBCUTANEOUS at 17:04

## 2023-01-30 RX ADMIN — DAPAGLIFLOZIN SCH MG: 10 TABLET, FILM COATED ORAL at 09:10

## 2023-01-30 RX ADMIN — INSULIN DETEMIR SCH UNIT: 100 INJECTION, SOLUTION SUBCUTANEOUS at 09:06

## 2023-01-30 RX ADMIN — FLUTICASONE PROPIONATE SCH: 50 SPRAY, METERED NASAL at 15:25

## 2023-01-30 RX ADMIN — Medication SCH MCG: at 09:06

## 2023-01-30 RX ADMIN — HEPARIN SODIUM SCH UNIT: 5000 INJECTION INTRAVENOUS; SUBCUTANEOUS at 00:12

## 2023-01-30 RX ADMIN — FLUTICASONE PROPIONATE SCH: 50 SPRAY, METERED NASAL at 00:12

## 2023-01-30 RX ADMIN — ASPIRIN 81 MG CHEWABLE TABLET SCH MG: 81 TABLET CHEWABLE at 09:06

## 2023-01-30 RX ADMIN — FLUTICASONE PROPIONATE SCH SPRAY: 50 SPRAY, METERED NASAL at 23:57

## 2023-01-30 RX ADMIN — LOSARTAN POTASSIUM SCH MG: 50 TABLET, FILM COATED ORAL at 09:09

## 2023-01-30 RX ADMIN — Medication SCH MG: at 20:26

## 2023-01-30 RX ADMIN — HEPARIN SODIUM SCH UNIT: 5000 INJECTION INTRAVENOUS; SUBCUTANEOUS at 16:01

## 2023-01-30 RX ADMIN — ACETAMINOPHEN PRN MG: 325 TABLET, FILM COATED ORAL at 00:07

## 2023-01-30 RX ADMIN — Medication SCH MG: at 09:10

## 2023-01-30 RX ADMIN — DOCUSATE SODIUM AND SENNOSIDES SCH EACH: 50; 8.6 TABLET ORAL at 09:06

## 2023-01-30 RX ADMIN — PANTOPRAZOLE SODIUM SCH MG: 40 INJECTION, POWDER, FOR SOLUTION INTRAVENOUS at 09:06

## 2023-01-30 RX ADMIN — FUROSEMIDE SCH MG: 40 TABLET ORAL at 09:06

## 2023-01-30 RX ADMIN — TAMSULOSIN HYDROCHLORIDE SCH MG: 0.4 CAPSULE ORAL at 09:10

## 2023-01-30 RX ADMIN — INSULIN ASPART SCH UNIT: 100 INJECTION, SOLUTION INTRAVENOUS; SUBCUTANEOUS at 11:56

## 2023-01-30 NOTE — PN
PROGRESS NOTE



DATE OF SERVICE:  01/29/2023



SUBJECTIVE:

This 67-year-old gentleman was admitted with syncope and also had Wenckebach

phenomenon.  The patient continued to be symptomatic with dizziness and weakness.  The

cardiac arrhythmia is persisting. Beta blockers has been stopped.



OBJECTIVE:

VITAL SIGNS: Pulse 74, blood pressure 117/59, respirations 18.

CHEST:  Clear to auscultation.

CARDIOVASCULAR: S1, S2.

ABDOMEN:  Soft.

NERVOUS SYSTEM: No focal deficits.



LABORATORY DATA:

Reviewed.



ASSESSMENT:

1. Syncope, possibly secondary to Wenckebach phenomenon.

2. and second-degree heart block.

3. History of recent urinary tract infection with sepsis.

4. Diabetes mellitus, type 2.

5. Hypertension.

6. Hyperlipidemia.

7. Possible sick euthyroid syndrome.



RECOMMENDATIONS:

Recommend to continue current medications, symptomatic treatment.  Otherwise, at this

time I recommend continued telemetry. Closely follow with Cardiology.  Symptomatic

treatment will be provided.  See orders for further details.  Discussed with staff and

patient.  Further recommendations to follow.





MMODL / IJN: [id number] / Job#: [id number]

## 2023-01-30 NOTE — XR
EXAMINATION TYPE: XR knee complete RT

 

DATE OF EXAM: 1/30/2023

 

COMPARISON: NONE

 

HISTORY: Knee pain. Fall.

 

TECHNIQUE: 3 views

 

FINDINGS: There is some spurring on the patella. There is mild knee joint effusion. There is spurring
 at the tibial tubercle. There is narrowing of the joint spaces and more on the lateral aspect. There
 is calcification of the menisci. No fracture seen.

 

IMPRESSION: Osteoarthritis and chondrocalcinosis.

 

Small knee joint effusion. No fracture seen.

## 2023-01-30 NOTE — P.PN
Subjective





HISTORY OF PRESENTING ILLNESS


This is a pleasant 67-year-old gentleman who does not follow regularly with a 

cardiologist.  He has a history of hypertension, hyperlipidemia and diabetes.  

Was recently in [facility] after becoming weak and falling at home.  At 

that time he was found to have a urinary tract infection and spent about 13 days

in the hospital.  He was subsequently transferred to [facility] for rehab.  He's 

been there since Tuesday.  He was transferred to the emergency department here 

after becoming quite lightheaded, somewhat short of breath, and diaphoretic and 

noted to have a low heart rate.  EKG admission and telemetry shows patient to be

in second-degree type I AV block with heart rates anywhere from 40s-50s.  He 

continues to have occasional episodes of lightheadedness.  Blood pressure has 

been elevated.  Labs showed a hemoglobin of 10.9, sodium 136, potassium 4.5, BUN

14, creatinine 1.21, troponin have been negative 2 and TSH is mildly elevated 

at 5.980.  He complains of lower extremity edema that he's had since he was 

admitted to [facility].  He denies any dyspnea on exertion but is not 

very active and has been less active recently.  Denies any orthopnea or PND.  

Denies any chest discomfort, palpitations or syncope.





1/29/2023


Patient was seen and examined resting comfortably in bed.  Metoprolol succinate 

continues to be on hold.  Continues to have episodes of second-degree type I AV 

block.  Had an episode of nausea, dizziness and diaphoresis morning but is 

feeling better now.  Put pressure is better controlled.  But remains elevated.  

Labs are stable.  Echocardiogram with Doppler study 1/28/23 showed normal LV 

systolic function with mild MR and mild TR.





1/30


Patient denies any chest pains, shortness of breath, dizziness, or 

lightheadedness. Blood pressures better controlled today. HR's 60-80's. 





PHYSICAL EXAMINATION


Vital signs reviewed.


CONSTITUTIONAL: No apparent distress. 


HEENT: Head is normocephalic. Pupils are equal, round. Sclerae anicteric. Mucous

membranes of the mouth are moist.  No JVD. No carotid bruit.


CHEST EXAMINATION: Lungs are clear to auscultation. No chest wall tenderness is 

noted on palpation or with deep breathing. 


HEART EXAMINATION: Regular rate and rhythm. S1, S2 heard. No murmurs, gallops or

rub.


ABDOMEN: Soft, nontender. Positive bowel sounds.


EXTREMITIES: 2+ peripheral pulses, no lower extremity edema and no calf 

tenderness.


NEUROLOGIC EXAMINATION: Patient is awake, alert and oriented x3. 





Assessment: 


#1 symptoms of lightheadedness, shortness of breath diaphoresis


2 second degree type I AV block


#3 hypertension, poorly controlled


#4 hyperlipidemia


#5 diabetes mellitus





Plan: 


We will continue to hold the beta blocker.  Continue to follow blood pressure 

closely. The symptoms are unlikely to be related to the Second degree type I 

AVB. Patient and daughter updated on plan of care, no indication for pacemaker 

at this time. Hopeful DC from cardio standpoint in next 24 hrs if remains stable











Objective





- Vital Signs


Vital signs: 


                                   Vital Signs











Temp  97.8 F   01/30/23 08:00


 


Pulse  82   01/30/23 08:00


 


Resp  17   01/30/23 08:00


 


BP  133/80   01/30/23 08:00


 


Pulse Ox  96   01/30/23 08:00


 


FiO2  21   01/28/23 19:57








                                 Intake & Output











 01/29/23 01/30/23 01/30/23





 18:59 06:59 18:59


 


Output Total 1200 1250 500


 


Balance -1200 -1250 -500


 


Output:   


 


  Urine 1200 1250 500


 


Other:   


 


  Voiding Method Indwelling Catheter Indwelling Catheter 














- Labs


CBC & Chem 7: 


                                 01/30/23 07:07





                                 01/30/23 07:07


Labs: 


                  Abnormal Lab Results - Last 24 Hours (Table)











  01/29/23 01/29/23 01/29/23 Range/Units





  11:38 16:52 20:18 


 


RBC     (4.30-5.90)  m/uL


 


Hgb     (13.0-17.5)  gm/dL


 


Hct     (39.0-53.0)  %


 


MCV     (80.0-100.0)  fL


 


Sodium     (137-145)  mmol/L


 


Carbon Dioxide     (22-30)  mmol/L


 


Creatinine     (0.66-1.25)  mg/dL


 


Glucose     (74-99)  mg/dL


 


POC Glucose (mg/dL)  162 H  216 H  180 H  ()  mg/dL


 


Calcium     (8.4-10.2)  mg/dL














  01/30/23 01/30/23 01/30/23 Range/Units





  06:08 07:07 07:07 


 


RBC   4.21 L   (4.30-5.90)  m/uL


 


Hgb   10.6 L   (13.0-17.5)  gm/dL


 


Hct   31.9 L   (39.0-53.0)  %


 


MCV   75.9 L   (80.0-100.0)  fL


 


Sodium    135 L  (137-145)  mmol/L


 


Carbon Dioxide    31 H  (22-30)  mmol/L


 


Creatinine    1.31 H  (0.66-1.25)  mg/dL


 


Glucose    144 H  (74-99)  mg/dL


 


POC Glucose (mg/dL)  147 H    ()  mg/dL


 


Calcium    8.1 L  (8.4-10.2)  mg/dL














  01/30/23 Range/Units





  08:52 


 


RBC   (4.30-5.90)  m/uL


 


Hgb   (13.0-17.5)  gm/dL


 


Hct   (39.0-53.0)  %


 


MCV   (80.0-100.0)  fL


 


Sodium   (137-145)  mmol/L


 


Carbon Dioxide   (22-30)  mmol/L


 


Creatinine   (0.66-1.25)  mg/dL


 


Glucose   (74-99)  mg/dL


 


POC Glucose (mg/dL)  201 H  ()  mg/dL


 


Calcium   (8.4-10.2)  mg/dL








                      Microbiology - Last 24 Hours (Table)











 01/28/23 09:20 Urine Culture - Final





 Urine,Voided

## 2023-01-31 VITALS
HEART RATE: 72 BPM | RESPIRATION RATE: 18 BRPM | TEMPERATURE: 97.6 F | SYSTOLIC BLOOD PRESSURE: 124 MMHG | DIASTOLIC BLOOD PRESSURE: 66 MMHG

## 2023-01-31 LAB
ANION GAP SERPL CALC-SCNC: 9 MMOL/L
BUN SERPL-SCNC: 13 MG/DL (ref 9–20)
CALCIUM SPEC-MCNC: 8.5 MG/DL (ref 8.4–10.2)
CHLORIDE SERPL-SCNC: 100 MMOL/L (ref 98–107)
CO2 SERPL-SCNC: 25 MMOL/L (ref 22–30)
GLUCOSE BLD-MCNC: 121 MG/DL (ref 70–110)
GLUCOSE BLD-MCNC: 135 MG/DL (ref 70–110)
GLUCOSE BLD-MCNC: 141 MG/DL (ref 70–110)
GLUCOSE SERPL-MCNC: 139 MG/DL (ref 74–99)
POTASSIUM SERPL-SCNC: 4.2 MMOL/L (ref 3.5–5.1)
SODIUM SERPL-SCNC: 134 MMOL/L (ref 137–145)

## 2023-01-31 RX ADMIN — ASPIRIN 81 MG CHEWABLE TABLET SCH MG: 81 TABLET CHEWABLE at 08:42

## 2023-01-31 RX ADMIN — PANTOPRAZOLE SODIUM SCH MG: 40 INJECTION, POWDER, FOR SOLUTION INTRAVENOUS at 08:43

## 2023-01-31 RX ADMIN — INSULIN ASPART SCH: 100 INJECTION, SOLUTION INTRAVENOUS; SUBCUTANEOUS at 06:29

## 2023-01-31 RX ADMIN — INSULIN DETEMIR SCH UNIT: 100 INJECTION, SOLUTION SUBCUTANEOUS at 08:43

## 2023-01-31 RX ADMIN — ACETAMINOPHEN PRN MG: 325 TABLET, FILM COATED ORAL at 08:43

## 2023-01-31 RX ADMIN — HEPARIN SODIUM SCH UNIT: 5000 INJECTION INTRAVENOUS; SUBCUTANEOUS at 08:43

## 2023-01-31 RX ADMIN — LOSARTAN POTASSIUM SCH MG: 50 TABLET, FILM COATED ORAL at 08:42

## 2023-01-31 RX ADMIN — ATORVASTATIN CALCIUM SCH MG: 40 TABLET, FILM COATED ORAL at 08:42

## 2023-01-31 RX ADMIN — INSULIN ASPART SCH: 100 INJECTION, SOLUTION INTRAVENOUS; SUBCUTANEOUS at 11:41

## 2023-01-31 RX ADMIN — DOCUSATE SODIUM AND SENNOSIDES SCH EACH: 50; 8.6 TABLET ORAL at 08:43

## 2023-01-31 RX ADMIN — Medication SCH MCG: at 08:43

## 2023-01-31 RX ADMIN — DAPAGLIFLOZIN SCH MG: 10 TABLET, FILM COATED ORAL at 08:43

## 2023-01-31 RX ADMIN — TAMSULOSIN HYDROCHLORIDE SCH MG: 0.4 CAPSULE ORAL at 08:42

## 2023-01-31 RX ADMIN — MECLIZINE PRN MG: 12.5 TABLET ORAL at 12:06

## 2023-01-31 RX ADMIN — FLUTICASONE PROPIONATE SCH SPRAY: 50 SPRAY, METERED NASAL at 12:07

## 2023-01-31 RX ADMIN — Medication SCH MG: at 08:43

## 2023-01-31 NOTE — P.PN
Subjective


Progress Note Date: 01/31/23





HISTORY OF PRESENTING ILLNESS


This is a pleasant 67-year-old gentleman who does not follow regularly with a 

cardiologist.  He has a history of hypertension, hyperlipidemia and diabetes.  

Was recently in [facility] after becoming weak and falling at home.  At 

that time he was found to have a urinary tract infection and spent about 13 days

in the hospital.  He was subsequently transferred to [facility] for rehab.  He's 

been there since Tuesday.  He was transferred to the emergency department here a

fter becoming quite lightheaded, somewhat short of breath, and diaphoretic and 

noted to have a low heart rate.  EKG admission and telemetry shows patient to be

in second-degree type I AV block with heart rates anywhere from 40s-50s.  He 

continues to have occasional episodes of lightheadedness.  Blood pressure has 

been elevated.  Labs showed a hemoglobin of 10.9, sodium 136, potassium 4.5, BUN

14, creatinine 1.21, troponin have been negative 2 and TSH is mildly elevated 

at 5.980.  He complains of lower extremity edema that he's had since he was 

admitted to [facility].  He denies any dyspnea on exertion but is not 

very active and has been less active recently.  Denies any orthopnea or PND.  

Denies any chest discomfort, palpitations or syncope.





1/29/2023


Patient was seen and examined resting comfortably in bed.  Metoprolol succinate 

continues to be on hold.  Continues to have episodes of second-degree type I AV 

block.  Had an episode of nausea, dizziness and diaphoresis morning but is 

feeling better now.  Put pressure is better controlled.  But remains elevated.  

Labs are stable.  Echocardiogram with Doppler study 1/28/23 showed normal LV 

systolic function with mild MR and mild TR.





1/30


Patient denies any chest pains, shortness of breath, dizziness, or 

lightheadedness. Blood pressures better controlled today. HR's 60-80's. 





1/31


Patient states that he was feeling a little lightheaded and then upset stomach 

this morning lasted about 30 minutes.  It has resolved.  He denies having any 

chest pain or pressure.  Heart rate has been in the 60s and 70s, blood pressure 

124/66, pulse ox 97% on room air.  Repeat blood work reveals potassium 4.2, BUN 

13 creatinine 1.22.





PHYSICAL EXAMINATION


Vital signs reviewed.


CONSTITUTIONAL: No apparent distress. 


HEENT: Head is normocephalic. Pupils are equal, round. Sclerae anicteric. Mucous

membranes of the mouth are moist.  No JVD. No carotid bruit.


CHEST EXAMINATION: Lungs are clear to auscultation. No chest wall tenderness is 

noted on palpation or with deep breathing. 


HEART EXAMINATION: Regular rate and rhythm. S1, S2 heard. No murmurs, gallops or

rub.


ABDOMEN: Soft, nontender. 


EXTREMITIES: 2+ peripheral pulses, no lower extremity edema and no calf 

tenderness.


NEUROLOGIC EXAMINATION: Patient is awake, alert and oriented x3. 





Assessment: 


#1 symptoms of lightheadedness, shortness of breath diaphoresis, does not appear

related to bradycardia


#2 second degree type I AV block


#3 hypertension, poorly controlled


#4 hyperlipidemia


#5 diabetes mellitus





Plan: 


We will continue to hold the beta blocker.  


No plan for pacemaker and no further cardiac workup warranted at this time


Cardiology will sign off and follow on an as-needed basis.  Please reconsult for

any new concerns.  





Nurse practitioner note has been reviewed, I agree with the documented findings 

and plan of care.  Patient was seen and examined.





Objective





- Vital Signs


Vital signs: 


                                   Vital Signs











Temp  97.6 F   01/31/23 11:23


 


Pulse  72   01/31/23 11:23


 


Resp  18   01/31/23 11:23


 


BP  124/66   01/31/23 11:23


 


Pulse Ox  97   01/31/23 11:23


 


FiO2  21   01/28/23 19:57








                                 Intake & Output











 01/30/23 01/31/23 01/31/23





 18:59 06:59 18:59


 


Intake Total 534  


 


Output Total 1700 2225 


 


Balance -1166 -2225 


 


Weight 145.15 kg  


 


Intake:   


 


  Oral 534  


 


Output:   


 


  Urine 1700 2225 


 


Other:   


 


  Voiding Method Indwelling Catheter Indwelling Catheter Indwelling Catheter














- Labs


CBC & Chem 7: 


                                 01/30/23 07:07





                                 01/31/23 07:22


Labs: 


                  Abnormal Lab Results - Last 24 Hours (Table)











  01/30/23 01/30/23 01/30/23 Range/Units





  11:36 16:41 20:35 


 


Sodium     (137-145)  mmol/L


 


Glucose     (74-99)  mg/dL


 


POC Glucose (mg/dL)  161 H  165 H  217 H  ()  mg/dL














  01/31/23 01/31/23 01/31/23 Range/Units





  06:17 07:22 08:19 


 


Sodium   134 L   (137-145)  mmol/L


 


Glucose   139 H   (74-99)  mg/dL


 


POC Glucose (mg/dL)  135 H   141 H  ()  mg/dL

## 2023-01-31 NOTE — P.DS
Providers


Date of admission: 


01/28/23 11:09





Expected date of discharge: 01/31/23


Attending physician: 


Archie Neal MD





Consults: 





                                        





01/28/23 11:09


Consult Physician Routine 


   Consulting Provider: [facility]


   Consult Reason/Comments: second degree heart block, symptomatic


   Do you want consulting provider notified?: Yes











Primary care physician: 


[facility] Course: 











Final diagnosis





Lightheadedness, dizziness, etiology not clear thought initially to be secondary

to second-degree block on EKG and beta blocker was discontinued


Hypertension history


Acute renal failure probably secondary to aggressive blood pressure control, 

recommending holding Lasix until follow-up labs in the next 2-3 days


Hyperlipidemia


Diabetes mellitus, type II, insulin-dependent


Morbid obesity with a BMI of 41.1


Right knee pain status post fall with no fractures noted


DVT prophylaxis


GI prophylaxis


Full code











Discharge disposition


Patient is being discharged in a stable condition with guarded prognosis to 

[facility].  Patient will follow-up with Dr. Morris in the outpatient 

setting upon discharge.  Patient is to continue to hold metoprolol and follow-up

with cardiology in the outpatient setting in the next 1-2 weeks as scheduled.  

Total time taken is greater than 35 minutes.





Hospital course


This is a 67-year-old male who was recently admitted with an episode of lig

htheadedness and diaphoresis found to have second-degree type I AV block and 

evaluated closely by cardiology.  Cardiology recommends holding beta blocker and

they do not believe this is a second-degree AV block that's causing his 

symptoms.  Patient had uncontrolled blood pressures and have added Norvasc to 

optimize blood pressure management.  Patient normally takes hydralazine with 

Lasix although recommend holding as kidney functions were mildly elevated on 

admission and recommend repeat labs in the next 2-3 days to monitor electrolytes

and kidney functions prior to resuming Lasix.  Cardiology recommending holding 

the patient for an additional 24 hours to follow-up with the a.m.  Patient was 

seen and evaluated by cardiology this morning and has cleared the patient for 

discharge.  Patient had a fall with some swelling of the right knee and x-ray 

shows no acute fracture just a trace of fluid noted.  Patient does continue with

indwelling Spears catheter which was reported as retention and recommended 

continue with for now and trial void in the outpatient setting.  Patient has 

been staying at [facility] for rehab and will be returning there.  Covid 19 testing

was negative.  Patient with some mild dizziness and episode of nausea with no 

vomiting noted has been started on low-dose meclizine as needed. Currently no 

reports of chest pain, shortness of breath, or palpitations.  Patient is a

febrile.  No reports of nausea or vomiting and patient is tolerating diet.  

Patient will be discharged to [facility] today.  Guarded prognosis.





Physical exam:








Gen: This is a 67-year-old male who is awake, alert and oriented 3, well-

developed, well-nourished, morbidly obese


HEENT: Head is atraumatic, normocephalic. Pupils equal, round. Sclerae is 

anicteric. 


NECK: Supple. No JVD. No lymphadenopathy. No thyromegaly. 


LUNGS: Clear to auscultation. No wheezes or rhonchi.  No intercostal 

retractions.


HEART: Regular rate and rhythm. No murmur. 


ABDOMEN: Soft. Bowel sounds are present. No masses.  No tenderness.


EXTREMITIES: No pedal edema.  No calf tenderness.  Right knee tenderness on 

palpation


NEUROLOGICAL: Patient is awake, alert and oriented x3. Cranial nerves 2 through 

12 are grossly intact.  Diffusely weak





Please refer to medication reconciliation sheet for a list of medications.





The impression and plan of care has been dictated by Ondina Aguilar, Nurse 

Practitioner as directed.





Dr. Sara MD


I have performed a history and examination and MDM of this patient, discussed 

the same with the dictator, and  agree with the dictator's assessment and plan 

as written ,documented as a scribe. Based on total visit time,  I have performed

more than 50% of the visit.  








Patient Condition at Discharge: Stable





Plan - Discharge Summary


Discharge Rx Participant: No


New Discharge Prescriptions: 


New


   Meclizine [Antivert] 6.25 mg PO TID PRN  tab


     PRN Reason: Vertigo


   Heparin Sodium,Porcine [Heparin Sodium] 5,000 unit SQ Q12HR 30 Days #60 each


   amLODIPine [Norvasc] 5 mg PO DAILY  tab





Continue


   Sennosides-Docusate Sodium [Senokot-S] 2 tab PO HS


   Docusate [Colace] 100 mg PO BID


   Pantoprazole Sodium [Protonix] 40 mg PO DAILY


   Insulin Glargine [Lantus Vial] 10 unit SQ DAILY


   Empagliflozin [Jardiance] 25 mg PO DAILY


   INSULIN LISPRO (HumaLOG) [humaLOG] See Protocol SQ AC-TID


   Tamsulosin HCl [Flomax] 0.4 mg PO DAILY


   Fluticasone Nasal Spray [Flonase Nasal Spray] 1 spray EA NOSTRIL Q12H


   Losartan Potassium [Cozaar] 100 mg PO DAILY


   Diclofenac Sodium [Voltaren] 75 mg PO BID PRN


     PRN Reason: Pain


   Coenzyme Q10 Tablet 100 mg PO BID


   Cholecalciferol [Vitamin D3 (25 Mcg = 1000 Iu)] 25 mcg PO DAILY


   Atorvastatin Calcium [Lipitor] 40 mg PO DAILY


   Acetaminophen [Tylenol] 325 mg PO Q6H PRN


     PRN Reason: GENERAL DISCOMFORT


   Dulaglutide [Trulicity] 1.5 mg SQ MO


   Cyanocobalamin [Vitamin B-12 Injection] 1,000 mcg SQ QMONTHLY


   Sildenafil Citrate 100 mg PO DAILY PRN


     PRN Reason: E.D


   Sennosides-Docusate Sodium [Senokot-S] 1 tab PO DAILY


   Epoetin Sesar-Epbx [Retacrit] 4,000 units SQ MOWEFR


   Melatonin 1 mg PO HS


   Magnesium Hydroxide [Milk of Magnesia Concentrate] 7,200 mg PO Q48H PRN


     PRN Reason: Constipation


   Lutein 40 mg PO DAILY


   Lidocaine 5% Patch [Lidoderm 5% Patch] 2 patch TOPICAL DAILY


   Na Phos,M-B/Na Phos,Di-Ba [Fleet Adult] 133 ml RECTAL DAILY PRN


     PRN Reason: Constipation


   Ferrous Sulfate [Iron] 325 mg PO DAILY


   bisacodyL [Dulcolax] 10 mg RECTAL DAILY PRN


     PRN Reason: Constipation


   Aspirin EC [Ecotrin Low Dose] 81 mg PO DAILY


   traMADol HCL 50 mg PO Q6H PRN #6 tab


     PRN Reason: Pain





Discontinued


   Metoprolol Succinate (ER) [Toprol Xl] 50 mg PO DAILY


   Furosemide [Lasix] 40 mg PO DAILY


Discharge Medication List





Acetaminophen [Tylenol] 325 mg PO Q6H PRN 01/28/23 [History]


Aspirin EC [Ecotrin Low Dose] 81 mg PO DAILY 01/28/23 [History]


Atorvastatin Calcium [Lipitor] 40 mg PO DAILY 01/28/23 [History]


Cholecalciferol [Vitamin D3 (25 Mcg = 1000 Iu)] 25 mcg PO DAILY 01/28/23 [Hist

ory]


Coenzyme Q10 Tablet 100 mg PO BID 01/28/23 [History]


Cyanocobalamin [Vitamin B-12 Injection] 1,000 mcg SQ QMONTHLY 01/28/23 [History]


Diclofenac Sodium [Voltaren] 75 mg PO BID PRN 01/28/23 [History]


Docusate [Colace] 100 mg PO BID 01/28/23 [History]


Dulaglutide [Trulicity] 1.5 mg SQ MO 01/28/23 [History]


Empagliflozin [Jardiance] 25 mg PO DAILY 01/28/23 [History]


Epoetin Sesar-Epbx [Retacrit] 4,000 units SQ MOWEFR 01/28/23 [History]


Ferrous Sulfate [Iron] 325 mg PO DAILY 01/28/23 [History]


Fluticasone Nasal Spray [Flonase Nasal Spray] 1 spray EA NOSTRIL Q12H 01/28/23 

[History]


INSULIN LISPRO (HumaLOG) [humaLOG] See Protocol SQ AC-TID 01/28/23 [History]


Insulin Glargine [Lantus Vial] 10 unit SQ DAILY 01/28/23 [History]


Lidocaine 5% Patch [Lidoderm 5% Patch] 2 patch TOPICAL DAILY 01/28/23 [History]


Losartan Potassium [Cozaar] 100 mg PO DAILY 01/28/23 [History]


Lutein 40 mg PO DAILY 01/28/23 [History]


Magnesium Hydroxide [Milk of Magnesia Concentrate] 7,200 mg PO Q48H PRN 01/28/23

[History]


Melatonin 1 mg PO HS 01/28/23 [History]


Na Phos,M-B/Na Phos,Di-Ba [Fleet Adult] 133 ml RECTAL DAILY PRN 01/28/23 

[History]


Pantoprazole Sodium [Protonix] 40 mg PO DAILY 01/28/23 [History]


Sennosides-Docusate Sodium [Senokot-S] 1 tab PO DAILY 01/28/23 [History]


Sennosides-Docusate Sodium [Senokot-S] 2 tab PO HS 01/28/23 [History]


Sildenafil Citrate 100 mg PO DAILY PRN 01/28/23 [History]


Tamsulosin HCl [Flomax] 0.4 mg PO DAILY 01/28/23 [History]


bisacodyL [Dulcolax] 10 mg RECTAL DAILY PRN 01/28/23 [History]


Heparin Sodium,Porcine [Heparin Sodium] 5,000 unit SQ Q12HR 30 Days #60 each 

01/31/23 [Rx]


Meclizine [Antivert] 6.25 mg PO TID PRN  tab 01/31/23 [Rx]


amLODIPine [Norvasc] 5 mg PO DAILY  tab 01/31/23 [Rx]


traMADol HCL 50 mg PO Q6H PRN #6 tab 01/31/23 [Rx]








Follow up Appointment(s)/Referral(s): 


Alan Morris DO [Primary Care Provider] - 1-2 days


Activity/Diet/Wound Care/Special Instructions: 


Patient is returning to [facility]


Activity as tolerated


Continue with indwelling Spears catheter


Continue current cardiac medications and has been evaluated by cardiology


Recommend continue holding Lasix for now and follow-up with repeat labs of CBC 

and BMP in the next 2-3 days, kidney functions improving and creatinine is 1.22 

today


Recommend continuing heart healthy cardiac/diabetic diet


Recommend continue monitoring Accu-Cheks before meals and at bedtime and 

continue current medication regimen


Beta blocker on hold for now per cardiology and recommended cardiology follow-up


Discharge Disposition: TRANSFER TO SNF/ECF The patient is a 8y2m Male complaining of